# Patient Record
Sex: MALE | Race: WHITE | NOT HISPANIC OR LATINO | URBAN - NONMETROPOLITAN AREA
[De-identification: names, ages, dates, MRNs, and addresses within clinical notes are randomized per-mention and may not be internally consistent; named-entity substitution may affect disease eponyms.]

---
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
## 2019-01-09 NOTE — PATIENT DISCUSSION
(X80.343) Keratoconjunct sicca, not specified as Sjogren's, bilateral - Assesment : Examination revealed Dry Eye Syndrome. [place] VF and DMV form completed today. - Plan : Monitor for changes. Recommended regular use of ATs 2-4 times per day and prn. TheraTears coupon given today.

## 2019-01-09 NOTE — PATIENT DISCUSSION
(H40.013) Open angle with borderline findings, low risk, bilateral - Assesment : Examination revealed Open Angle Glaucoma. OCT ONH performed: stable today. - Plan : Continue to monitor for IOP and NFL changes with visits, OCTs, and HVFs. RTC in 6 months for Exam and OCT ONH, sooner if problems or changes occur. **Will plan next HVF for following IOP Check appt.

## 2019-07-10 NOTE — PATIENT DISCUSSION
(W95.666) Keratoconjunct sicca, not specified as Sjogren's, bilateral - Assesment : Examination revealed Dry Eye Syndrome. - Plan : Monitor for changes. Continue regular use of ATs 2-4 times per day and prn.

## 2019-07-10 NOTE — PATIENT DISCUSSION
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 - Plan : Monitor for changes. Advised patient to call our office with decreased vision or increased symptoms.

## 2019-07-10 NOTE — PATIENT DISCUSSION
(H35.308) Drusen (degenerative) of macula, bilateral - Assesment : Examination revealed rare Drusen. - Plan : Monitor for macular changes with dilation and Mac OCT. Eat healthy and wear sunglasses. Call with any vision changes or distortion.

## 2019-07-10 NOTE — PATIENT DISCUSSION
Monitor for macular changes with dilation and Mac OCT. Eat healthy and wear sunglasses. Call with any vision changes or distortion.

## 2019-07-10 NOTE — PATIENT DISCUSSION
(H40.013) Open angle with borderline findings, low risk, bilateral - Assesment : Examination revealed Open Angle Glaucoma. OCT ONH performed: stable today. - Plan : Continue to monitor for IOP and NFL changes with visits, OCTs, and HVFs. RTC in 6 months for HVF and IOP Check, sooner if problems or changes occur.

## 2020-07-09 ENCOUNTER — IMPORTED ENCOUNTER (OUTPATIENT)
Dept: URBAN - NONMETROPOLITAN AREA CLINIC 1 | Facility: CLINIC | Age: 20
End: 2020-07-09

## 2020-07-09 PROBLEM — H16.041: Noted: 2020-07-09

## 2020-07-09 PROCEDURE — 99203 OFFICE O/P NEW LOW 30 MIN: CPT

## 2020-07-09 NOTE — PATIENT DISCUSSION
corneal ulcer discussed in detaill with ptSTART ofloxacin qhr OD X10 days sent to [facility] continue to monitorinstructed pt if no better tomorrow call us and can be seen in [facility]

## 2021-09-21 NOTE — PATIENT DISCUSSION
Monitor for macular changes with visits and Mac OCT. Eat healthy and wear sunglasses. Call with any vision changes or distortion.

## 2022-04-09 ASSESSMENT — VISUAL ACUITY
OD_CC: 20/70
OD_PH: 20/20
